# Patient Record
Sex: MALE | Race: WHITE
[De-identification: names, ages, dates, MRNs, and addresses within clinical notes are randomized per-mention and may not be internally consistent; named-entity substitution may affect disease eponyms.]

---

## 2020-08-06 ENCOUNTER — HOSPITAL ENCOUNTER (EMERGENCY)
Dept: HOSPITAL 4 - SED | Age: 35
Discharge: HOME | End: 2020-08-06
Payer: SELF-PAY

## 2020-08-06 VITALS — SYSTOLIC BLOOD PRESSURE: 132 MMHG

## 2020-08-06 VITALS — BODY MASS INDEX: 21.2 KG/M2 | HEIGHT: 73 IN | WEIGHT: 160 LBS | SYSTOLIC BLOOD PRESSURE: 134 MMHG
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
## 2020-08-06 DIAGNOSIS — S60.222A: Primary | ICD-10-CM
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
## 2020-08-06 DIAGNOSIS — F41.9: ICD-10-CM

## 2022-12-05 ENCOUNTER — OFFICE (OUTPATIENT)
Dept: URBAN - METROPOLITAN AREA CLINIC 70 | Facility: CLINIC | Age: 37
End: 2022-12-05

## 2022-12-05 VITALS
DIASTOLIC BLOOD PRESSURE: 98 MMHG | SYSTOLIC BLOOD PRESSURE: 161 MMHG | WEIGHT: 155 LBS | HEIGHT: 73 IN | HEART RATE: 74 BPM

## 2022-12-05 DIAGNOSIS — F10.21 ALCOHOL DEPENDENCE, IN REMISSION: ICD-10-CM

## 2022-12-05 DIAGNOSIS — F19.21 DRUG DEPENDENCE, IN REMISSION: ICD-10-CM

## 2022-12-05 DIAGNOSIS — D50.9 IRON DEFICIENCY ANEMIA: ICD-10-CM

## 2022-12-05 PROCEDURE — 99205 OFFICE O/P NEW HI 60 MIN: CPT | Performed by: SPECIALIST

## 2022-12-05 NOTE — SERVICEHPINOTES
37-year-old male presents with new onset of iron deficiency anemia. His lab work from October showed iron deficiency anemia with a hemoglobin of 13. The patient denies any obvious gastrointestinal bleeding in the form of melena or hematochezia. He denies any ongoing abdominal pain, constipation, diarrhea, heartburn, nausea, vomiting, change in weight or appetite. He denies use of NSAIDs. He had stool test for occult blood x 3 on 3 consecutive days and that was negative. He has not started on iron supplement yet. He had been a heavy alcoholic in his 20s and has been sober for about 2 years now. He also has history of drug use.
br
regi   The patient initially presented to his primary care physician for fatigue and left shoulder pain. He had a MRI showing a large joint effusion and severe fluid distention with lymph node enlargement at the bicep. He is an  and may have had some trauma to the area. He is following up with an orthopedic surgeon.

## 2022-12-13 ENCOUNTER — AMBULATORY SURGICAL CENTER (OUTPATIENT)
Dept: URBAN - METROPOLITAN AREA SURGERY 5 | Facility: SURGERY | Age: 37
End: 2022-12-13

## 2022-12-13 VITALS
OXYGEN SATURATION: 97 % | WEIGHT: 155 LBS | DIASTOLIC BLOOD PRESSURE: 94 MMHG | TEMPERATURE: 98 F | RESPIRATION RATE: 20 BRPM | SYSTOLIC BLOOD PRESSURE: 150 MMHG | HEART RATE: 92 BPM | HEIGHT: 73 IN

## 2022-12-13 DIAGNOSIS — K31.89 OTHER DISEASES OF STOMACH AND DUODENUM: ICD-10-CM

## 2022-12-13 DIAGNOSIS — K62.89 OTHER SPECIFIED DISEASES OF ANUS AND RECTUM: ICD-10-CM

## 2022-12-13 PROCEDURE — 43239 EGD BIOPSY SINGLE/MULTIPLE: CPT | Performed by: INTERNAL MEDICINE

## 2022-12-13 PROCEDURE — 45378 DIAGNOSTIC COLONOSCOPY: CPT | Performed by: INTERNAL MEDICINE

## 2022-12-14 LAB — SURGICAL: PDF REPORT: (no result)
